# Patient Record
(demographics unavailable — no encounter records)

---

## 2024-10-17 NOTE — DISCUSSION/SUMMARY
[FreeTextEntry1] : Recommend supportive care including antipyretics, fluids, OTC cough/cold medications if age-appropriate, and nasal saline followed by nasal suction. Return if symptoms worsen or persist. .Bucio today, tolerated well.  [] : The components of the vaccine(s) to be administered today are listed in the plan of care. The disease(s) for which the vaccine(s) are intended to prevent and the risks have been discussed with the caretaker.  The risks are also included in the appropriate vaccination information statements which have been provided to the patient's caregiver.  The caregiver has given consent to vaccinate.

## 2024-10-17 NOTE — DISCUSSION/SUMMARY
[FreeTextEntry1] : Recommend supportive care including antipyretics, fluids, OTC cough/cold medications if age-appropriate, and nasal saline followed by nasal suction. Return if symptoms worsen or persist. .Bucio today, tolerated well.  [] : The components of the vaccine(s) to be administered today are listed in the plan of care. The disease(s) for which the vaccine(s) are intended to prevent and the risks have been discussed with the caretaker.  The risks are also included in the appropriate vaccination information statements which have been provided to the patient's caregiver.  The caregiver has given consent to vaccinate. 02-Jun-2018

## 2024-10-17 NOTE — HISTORY OF PRESENT ILLNESS
[EENT/Resp Symptoms] : EENT/RESPIRATORY SYMPTOMS [Nasal Congestion] : nasal congestion [Cough] : cough [Fever] : no fever [Ear Pain] : no ear pain [Sore Throat] : no sore throat [Decreased Appetite] : no decreased appetite [Vomiting] : no vomiting [Diarrhea] : no diarrhea [Decreased Urine Output] : no decreased urine output [de-identified] : no meds, no alelrgies  [FreeTextEntry6] : Bucio today

## 2024-10-17 NOTE — HISTORY OF PRESENT ILLNESS
[EENT/Resp Symptoms] : EENT/RESPIRATORY SYMPTOMS [Nasal Congestion] : nasal congestion [Cough] : cough [Fever] : no fever [Ear Pain] : no ear pain [Sore Throat] : no sore throat [Decreased Appetite] : no decreased appetite [Vomiting] : no vomiting [Diarrhea] : no diarrhea [Decreased Urine Output] : no decreased urine output [de-identified] : no meds, no alelrgies  [FreeTextEntry6] : Bucio today

## 2025-01-09 NOTE — PHYSICAL EXAM
[Moves All Extremities x 4] : moves all extremities x4 [Warm, Well Perfused x4] : warm, well perfused x4 [Capillary Refill <2s] : capillary refill < 2s [NL] : warm, clear [de-identified] : pain with flexion and extension

## 2025-01-09 NOTE — REVIEW OF SYSTEMS
[Myalgia] : myalgia [Restriction of Motion] : restriction of motion [Swelling of Joint] : no swelling of joint [Negative] : Genitourinary

## 2025-01-09 NOTE — HISTORY OF PRESENT ILLNESS
[FreeTextEntry6] : fell down the stairs at school  hurt right ankle no swelling no bruising, able to bear weight

## 2025-01-09 NOTE — DISCUSSION/SUMMARY
[FreeTextEntry1] : ankle wrapped RICE, tylenol given  for worsening pain, swelling, bruising return to office note provided to excuse from gym class for tomorrow

## 2025-02-10 NOTE — PHYSICAL EXAM
[Clear Rhinorrhea] : clear rhinorrhea [Hypertrophied Nasal Mucosa] : hypertrophied nasal mucosa [Wheezing] : wheezing [Rales] : rales [Rhonchi] : rhonchi [NL] : warm, clear [FreeTextEntry4] : right nare outer area swelling and infamed as well

## 2025-02-10 NOTE — DISCUSSION/SUMMARY
[FreeTextEntry1] : Recommend mucinex in addition to antibiotics. Return for follow up in 1-2 wks. if worsens or distress to call back  apply cream if worsens to go to derm

## 2025-02-10 NOTE — HISTORY OF PRESENT ILLNESS
[de-identified] : Coughing and sore throat  [FreeTextEntry6] : Pt states they have had a cough with a sore throat since Sunday and R ear pain since today morning. Pt has headaches with no fever

## 2025-02-10 NOTE — HISTORY OF PRESENT ILLNESS
[de-identified] : Coughing and sore throat  [FreeTextEntry6] : Pt states they have had a cough with a sore throat since Sunday and R ear pain since today morning. Pt has headaches with no fever

## 2025-02-24 NOTE — HISTORY OF PRESENT ILLNESS
[EENT/Resp Symptoms] : EENT/RESPIRATORY SYMPTOMS [___ Day(s)] : [unfilled] day(s) [Fever] : fever [Sore Throat] : sore throat [Cough] : cough [Decreased Appetite] : decreased appetite [Ear Pain] : no ear pain [Vomiting] : no vomiting [Diarrhea] : no diarrhea [Decreased Urine Output] : no decreased urine output [de-identified] : no meds, no allergies

## 2025-02-24 NOTE — DISCUSSION/SUMMARY
[FreeTextEntry1] : Assessment and Plan:   - Influenza-Like Illness     - Therapeutic Interventions: Recommended use of Mucinex, and the asthma pump.     - Diagnostic Tests: Viral PCR test will be run.     - Referrals: None required at the moment.     - Patient Education: Advised Kimberley to drink lots of water and ensure she is peeing regularly.     - Follow-Up: Need to monitor for symptom improvement over the next few days. Encouraged her to call in if symptoms persist or worsen   - Possible Strep Throat     - Therapeutic Interventions: No medication planned immediately. An antibiotic has been sent to the pharmacy to take them if symptoms worsen.     - Diagnostic Tests: Throat culture pending     - Referrals: None required at the moment.     - Patient Education: Advised Kimberley to drink lots of water and ensure she is peeing regularly.     - Follow-Up: Need to monitor for symptom improvement over the next few days.